# Patient Record
Sex: FEMALE
[De-identification: names, ages, dates, MRNs, and addresses within clinical notes are randomized per-mention and may not be internally consistent; named-entity substitution may affect disease eponyms.]

---

## 2017-11-17 ENCOUNTER — HOSPITAL ENCOUNTER (OUTPATIENT)
Dept: HOSPITAL 31 - C.ER | Age: 62
Setting detail: OBSERVATION
LOS: 3 days | Discharge: HOME | End: 2017-11-20
Attending: INTERNAL MEDICINE | Admitting: INTERNAL MEDICINE
Payer: MEDICAID

## 2017-11-17 DIAGNOSIS — I10: ICD-10-CM

## 2017-11-17 DIAGNOSIS — R07.89: Primary | ICD-10-CM

## 2017-11-17 LAB
ALBUMIN/GLOB SERPL: 1.6 {RATIO} (ref 1–2.1)
ALP SERPL-CCNC: 69 U/L (ref 38–126)
ALT SERPL-CCNC: 33 U/L (ref 9–52)
APTT BLD: 28 SECONDS (ref 21–34)
AST SERPL-CCNC: 18 U/L (ref 14–36)
BACTERIA #/AREA URNS HPF: (no result) /[HPF]
BASOPHILS # BLD AUTO: 0 K/UL (ref 0–0.2)
BASOPHILS NFR BLD: 0.5 % (ref 0–2)
BILIRUB SERPL-MCNC: 1 MG/DL (ref 0.2–1.3)
BILIRUB UR-MCNC: NEGATIVE MG/DL
BUN SERPL-MCNC: 20 MG/DL (ref 7–17)
CALCIUM SERPL-MCNC: 8.4 MG/DL (ref 8.6–10.4)
CHLORIDE SERPL-SCNC: 100 MMOL/L (ref 98–107)
CO2 SERPL-SCNC: 27 MMOL/L (ref 22–30)
EOSINOPHIL # BLD AUTO: 0.2 K/UL (ref 0–0.7)
EOSINOPHIL NFR BLD: 2.1 % (ref 0–4)
ERYTHROCYTE [DISTWIDTH] IN BLOOD BY AUTOMATED COUNT: 13.6 % (ref 11.5–14.5)
GLOBULIN SER-MCNC: 2.6 GM/DL (ref 2.2–3.9)
GLUCOSE SERPL-MCNC: 84 MG/DL (ref 65–105)
GLUCOSE UR STRIP-MCNC: NORMAL MG/DL
HCT VFR BLD CALC: 38.7 % (ref 34–47)
INR PPP: 1
KETONES UR STRIP-MCNC: NEGATIVE MG/DL
LEUKOCYTE ESTERASE UR-ACNC: (no result) LEU/UL
LYMPHOCYTES # BLD AUTO: 2 K/UL (ref 1–4.3)
LYMPHOCYTES NFR BLD AUTO: 22.6 % (ref 20–40)
MCH RBC QN AUTO: 28.8 PG (ref 27–31)
MCHC RBC AUTO-ENTMCNC: 34.2 G/DL (ref 33–37)
MCV RBC AUTO: 84.2 FL (ref 81–99)
MONOCYTES # BLD: 0.5 K/UL (ref 0–0.8)
MONOCYTES NFR BLD: 6.2 % (ref 0–10)
NRBC BLD AUTO-RTO: 0.1 % (ref 0–2)
PH UR STRIP: 6 [PH] (ref 5–8)
PLATELET # BLD: 256 K/UL (ref 130–400)
PMV BLD AUTO: 8.7 FL (ref 7.2–11.7)
POTASSIUM SERPL-SCNC: 3.5 MMOL/L (ref 3.6–5.2)
PROT SERPL-MCNC: 6.6 G/DL (ref 6.3–8.3)
PROT UR STRIP-MCNC: NEGATIVE MG/DL
RBC # UR STRIP: (no result) /UL
RBC #/AREA URNS HPF: 22 /HPF (ref 0–3)
SODIUM SERPL-SCNC: 137 MMOL/L (ref 132–148)
SP GR UR STRIP: 1.02 (ref 1–1.03)
TRANS CELLS #/AREA URNS HPF: < 1 /HPF (ref 0–3)
UROBILINOGEN UR-MCNC: NORMAL MG/DL (ref 0.2–1)
WBC # BLD AUTO: 8.7 K/UL (ref 4.8–10.8)
WBC #/AREA URNS HPF: 8 /HPF (ref 0–5)

## 2017-11-17 PROCEDURE — 84484 ASSAY OF TROPONIN QUANT: CPT

## 2017-11-17 PROCEDURE — 81001 URINALYSIS AUTO W/SCOPE: CPT

## 2017-11-17 PROCEDURE — 36415 COLL VENOUS BLD VENIPUNCTURE: CPT

## 2017-11-17 PROCEDURE — 85025 COMPLETE CBC W/AUTO DIFF WBC: CPT

## 2017-11-17 PROCEDURE — 85610 PROTHROMBIN TIME: CPT

## 2017-11-17 PROCEDURE — 90732 PPSV23 VACC 2 YRS+ SUBQ/IM: CPT

## 2017-11-17 PROCEDURE — 93306 TTE W/DOPPLER COMPLETE: CPT

## 2017-11-17 PROCEDURE — 70450 CT HEAD/BRAIN W/O DYE: CPT

## 2017-11-17 PROCEDURE — 71010: CPT

## 2017-11-17 PROCEDURE — 99285 EMERGENCY DEPT VISIT HI MDM: CPT

## 2017-11-17 PROCEDURE — 82948 REAGENT STRIP/BLOOD GLUCOSE: CPT

## 2017-11-17 PROCEDURE — 87086 URINE CULTURE/COLONY COUNT: CPT

## 2017-11-17 PROCEDURE — 93005 ELECTROCARDIOGRAM TRACING: CPT

## 2017-11-17 PROCEDURE — 90471 IMMUNIZATION ADMIN: CPT

## 2017-11-17 PROCEDURE — 80053 COMPREHEN METABOLIC PANEL: CPT

## 2017-11-17 PROCEDURE — 85730 THROMBOPLASTIN TIME PARTIAL: CPT

## 2017-11-17 NOTE — CP.PCM.HP
History of Present Illness





- History of Present Illness


History of Present Illness: 





COMPREHENSIVE   HISTORY & PHYSICAL EXAM 


   


HPI


One day history of left-sided chest pain stopped a stabbing with radiation to 

left arm associated with mild diaphoresis.  Patient was evaluated in the ER 

with normal troponin and EKG.


There is a past history of hypertension.





PAST HIST.


PERSONAL HIST:   Smoking.   N   Alcohol.   N      Allergy N            Travel_-

      .


FAMILY HIST : 


ROS :


Constitutional: Negative for weight change, chills, night sweats, fatigue and 

usage of assist device. 


  Eyes: Negative for redness, swelling, itching, discharge, vision changes, 

blurry vision, double vision, glaucoma, cataracts, 


  Ears: Negative for hearing loss, ringing, , tinnitus, vertigo


 Nose: Negative for rhinorrhea, stuffiness, sniffing, itching, postnasal drip, 

discoloration, nasal congestion and epistaxis. 


 Throat: Negative for throat clearing, sore throat, hoarseness, difficulty 

swallowing and difficulty speaking. 


  Respiratory: Negative for cough, chest tightness, sputum or phlegm, chronic 

cough, hemoptysis, wheezing, snoring at night, pleuritic chest pain and daytime 

somnolence. 


 Cardiovascular: Negative for orthopnea, PND, Edema of legs, leg cramps, angina

, claudication, , irregular heartbeat,


 Neurology: Negative for irritability, muscle weakness, numbness and tingling, 

seizures, tremors, migraines, slurred speech, syncope, memory loss, mood changes

, recurrent headaches 


Gastrointestinal: Negative for difficulty swallowing, diarrhea, constipation, 

black stools, rectal bleeding, nausea, flatulence, reflux, poor appetite, 

changes in bowel habits, abdominal pain


  Genitourinary: Negative for frequent urination, hematuria, discharge, 

incontinence, urinary retention, frequent UTI, Psychiatric: Negative for 

depression, anxiety/panic, suicidal tendencies, 


Musculoskeletal: Negative for swollen joints, back pain, , neck pain, morning 

stiffness of joints, . 


 Skin: Negative for rash, ulcers, itching, dry skin and pigmented lesions.


P/E: 


  Constitutional: Appears stated age and in no apparent distress. 


 Head: Normocephalic. 


  Ears: External ear canals patent without inflammation. Tympanic membranes 

intact with normal light reflex and landmark. 


  Eyes: Pupils are central, bilaterally equal, symmetrical and reacts to light 

with normal movements and no icterus or pallor. 


 Nose: External nares are patent. Mucosa is pink  Mouth-Throat: Good general 

appearance and condition. No post-pharyngeal/oropharyngeal erythema and 

tonsillar hypertrophy. Good dental hygiene. 


  Neck-Lymphatic: Neck is supple with normal ROM, no thyromegaly, lymph nodes 

or masses. JVD is normal with no carotid bruit. 


  Lungs: Clear to percussion and auscultation with bilateral normal air entry. 


  Cardiovascular: S1 and S2 are normal with no murmurs, gallops and rub. 


  GI Exam: No hepatomegaly. Abdomen is soft and non-tender. No Organomegaly , 

masses or hernias are evident and bowel sounds are normal and active. 


  Neurology: Higher function and all cranial nerves intact, with no gross motor 

or sensory deficit. Superficial and deep reflexes are normal with downwards 

planters. No cerebellar deficit with normal gait. 


  Musculoskeletal: No tender spots with normal curvature of the spine with no 

swelling or restricted ROM of the small and large joints. 


  Extremities: Homans sign absent. Intact pulses with no pitting edema, calf 

tenderness or skin color changes. 


  Skin: No rash, eruptions or abnormal skin pigmentation





LAB/RADIOLOGY:


ASSESMENT :


Acute coronary syndrome.


Hypertension.








Present on Admission





- Present on Admission


Any Indicators Present on Admission: No





Past Patient History





- Past Social History


Smoking Status: Never Smoked





- ENDOCRINE/METABOLIC


Hx Diabetes Mellitus Type 2: Yes (not on meds)





- PSYCHIATRIC


Hx Substance Use: No





- SURGICAL HISTORY


Hx Cholecystectomy: Yes





Meds


Allergies/Adverse Reactions: 


 Allergies











Allergy/AdvReac Type Severity Reaction Status Date / Time


 


No Known Allergies Allergy   Unverified 11/17/17 15:15














Results





- Vital Signs


Recent Vital Signs: 





 Last Vital Signs











Temp  98.0 F   11/17/17 18:51


 


Pulse  72   11/17/17 18:51


 


Resp  18   11/17/17 18:51


 


BP  146/78   11/17/17 18:51


 


Pulse Ox  100   11/17/17 18:51














- Labs


Result Diagrams: 


 11/17/17 15:56





 11/17/17 15:56


Labs: 





 Laboratory Results - last 24 hr











  11/17/17 11/17/17 11/17/17





  15:56 15:56 15:56


 


WBC  8.7  


 


RBC  4.59  


 


Hgb  13.2  


 


Hct  38.7  


 


MCV  84.2  


 


MCH  28.8  


 


MCHC  34.2  


 


RDW  13.6  


 


Plt Count  256  


 


MPV  8.7  


 


Neut % (Auto)  68.6  


 


Lymph % (Auto)  22.6  


 


Mono % (Auto)  6.2  


 


Eos % (Auto)  2.1  


 


Baso % (Auto)  0.5  


 


Neut #  5.9  


 


Lymph #  2.0  


 


Mono #  0.5  


 


Eos #  0.2  


 


Baso #  0.0  


 


PT   11.3 


 


INR   1.0 


 


APTT   28 


 


Sodium    137


 


Potassium    3.5 L


 


Chloride    100


 


Carbon Dioxide    27


 


Anion Gap    14


 


BUN    20 H


 


Creatinine    0.6 L


 


Est GFR ( Amer)    > 60


 


Est GFR (Non-Af Amer)    > 60


 


Random Glucose    84


 


Calcium    8.4 L


 


Total Bilirubin    1.0


 


AST    18


 


ALT    33


 


Alkaline Phosphatase    69


 


Troponin I    < 0.0120


 


Total Protein    6.6


 


Albumin    4.1


 


Globulin    2.6


 


Albumin/Globulin Ratio    1.6


 


Urine Color   


 


Urine Clarity   


 


Urine pH   


 


Ur Specific Gravity   


 


Urine Protein   


 


Urine Glucose (UA)   


 


Urine Ketones   


 


Urine Blood   


 


Urine Nitrate   


 


Urine Bilirubin   


 


Urine Urobilinogen   


 


Ur Leukocyte Esterase   


 


Urine WBC (Auto)   


 


Urine RBC (Auto)   


 


Ur Squamous Epith Cells   


 


Ur Transition Epith Cell   


 


Urine Bacteria   














  11/17/17





  16:00


 


WBC 


 


RBC 


 


Hgb 


 


Hct 


 


MCV 


 


MCH 


 


MCHC 


 


RDW 


 


Plt Count 


 


MPV 


 


Neut % (Auto) 


 


Lymph % (Auto) 


 


Mono % (Auto) 


 


Eos % (Auto) 


 


Baso % (Auto) 


 


Neut # 


 


Lymph # 


 


Mono # 


 


Eos # 


 


Baso # 


 


PT 


 


INR 


 


APTT 


 


Sodium 


 


Potassium 


 


Chloride 


 


Carbon Dioxide 


 


Anion Gap 


 


BUN 


 


Creatinine 


 


Est GFR ( Amer) 


 


Est GFR (Non-Af Amer) 


 


Random Glucose 


 


Calcium 


 


Total Bilirubin 


 


AST 


 


ALT 


 


Alkaline Phosphatase 


 


Troponin I 


 


Total Protein 


 


Albumin 


 


Globulin 


 


Albumin/Globulin Ratio 


 


Urine Color  Yellow


 


Urine Clarity  Clear


 


Urine pH  6.0


 


Ur Specific Gravity  1.024


 


Urine Protein  Negative


 


Urine Glucose (UA)  Normal


 


Urine Ketones  Negative


 


Urine Blood  2+ H


 


Urine Nitrate  Negative


 


Urine Bilirubin  Negative


 


Urine Urobilinogen  Normal


 


Ur Leukocyte Esterase  1+ H


 


Urine WBC (Auto)  8 H


 


Urine RBC (Auto)  22 H


 


Ur Squamous Epith Cells  1


 


Ur Transition Epith Cell  < 1


 


Urine Bacteria  Occ H

## 2017-11-17 NOTE — CT
PROCEDURE:  CT HEAD WITHOUT CONTRAST.



HISTORY:

ha



COMPARISON:

None available. 



TECHNIQUE:

Axial computed tomography images were obtained through the head/brain 

without intravenous contrast.  



Radiation dose:



Total exam DLP = 842.23 mGy-cm.



This CT exam was performed using one or more of the following dose 

reduction techniques: Automated exposure control, adjustment of the 

mA and/or kV according to patient size, and/or use of iterative 

reconstruction technique.



FINDINGS:



HEMORRHAGE:

No intracranial hemorrhage. 



BRAIN:

Probable calcified meningioma in the anterior right temporal lobe/ 

right middle cranial fossa.  Recommend correlation with prior outside 

studies. Several incidental parenchymal calcifications in the left 

posterior temporal lobe common nonspecific. No evidence of acute 

infarct.



VENTRICLES:

Unremarkable. No hydrocephalus. 



CALVARIUM:

Unremarkable.



PARANASAL SINUSES:

Unremarkable as visualized. No significant inflammatory changes.



MASTOID AIR CELLS:

Unremarkable as visualized. No inflammatory changes.



OTHER FINDINGS:

None.



IMPRESSION:

No evidence of acute infarct. No intracranial hemorrhage. Probable 

calcified meningioma in the right temporal lobe/right middle cranial 

fossa. .  Please correlate with prior outside images if available.  

No acute findings.

## 2017-11-17 NOTE — RAD
PROCEDURE:  CHEST RADIOGRAPH, 1 VIEW



HISTORY:

chest pain



COMPARISON:

None available.



FINDINGS:



LUNGS:

Clear.



PLEURA:

No pneumothorax or pleural fluid seen.



CARDIOVASCULAR:

Normal.



OSSEOUS STRUCTURES:

No significant abnormalities.



VISUALIZED UPPER ABDOMEN:

Normal.



OTHER FINDINGS:

None. 



IMPRESSION:

No active disease.

## 2017-11-17 NOTE — C.PDOC
History Of Present Illness


63 y/o female, with PMHx of HTN, presents to ED for evaluation of intermittent b

/l stabbing chest pain and headache that developed today 2 hrs pta. Denies 

trauma, injury, fever or any other complaints at this time. no previou cardiac 

history. 





Time Seen by Provider: 17 15:36


Chief Complaint (Nursing): Chest Pain


History Per: Patient


History/Exam Limitations: no limitations


Onset/Duration Of Symptoms: Days


Current Symptoms Are (Timing): Still Present


Quality: "Pain"


Associated Symptoms: denies: Nausea, Dyspnea, Diaphoresis, Syncope


Modifying Factors: None


Exacerbating Factors: None


Alleviating Factors: None


Recent travel outside of the [place]: No


Additional History Per: Patient





Past Medical History


Reviewed: Historical Data, Nursing Documentation, Vital Signs


Vital Signs: 


 Last Vital Signs











Temp  97.5 F L  17 07:00


 


Pulse  68   17 07:00


 


Resp  20   17 07:00


 


BP  154/76 H  17 09:13


 


Pulse Ox  98   17 07:00











Surgical History: Cholecystectomy





- [facility] Procedures








DESTRUCT LARYNX LES NEC (97)








Family History: States: Unknown Family Hx





- Social History


Hx Alcohol Use: No


Hx Substance Use: No





Review Of Systems


Except As Marked, All Systems Reviewed And Found Negative.


Constitutional: Negative for: Fever, Chills


Cardiovascular: Positive for: Chest Pain.  Negative for: Palpitations


Respiratory: Negative for: Cough, Shortness of Breath


Neurological: Positive for: Headache.  Negative for: Weakness, Numbness, 

Dizziness





Physical Exam





- Physical Exam


Appears: Non-toxic, No Acute Distress


Skin: Normal Color, Warm, Dry


Head: Atraumatic, Normacephalic


Eye(s): bilateral: Normal Inspection


Oral Mucosa: Moist


Neck: Supple


Chest: Symmetrical, No Deformity, No Tenderness


Cardiovascular: Rhythm Regular, No Murmur


Respiratory: Normal Breath Sounds, No Rales, No Rhonchi, No Wheezing


Gastrointestinal/Abdominal: Soft, No Tenderness


Extremity: Normal ROM, No Pedal Edema


Neurological/Psych: Oriented x3, Normal Speech





ED Course And Treatment





- Laboratory Results


Result Diagrams: 


 17 07:00





 17 07:00


ECG: Interpreted By Me, Viewed By Me


ECG Rhythm: Sinus Bradycardia


ECG Interpretation: No Acute Changes


Rate From EC (bpm)


O2 Sat by Pulse Oximetry: 100 (RA)


Pulse Ox Interpretation: Normal





- CT Scan/US


  ** Head CT


Other Rad Studies (CT/US): Read By Radiologist, Radiology Report Reviewed


CT/US Interpretation: Accession No. : [id number].  Patient Name / ID : 

RONA VERDE  / [id number].  Exam Date : 2017 16:15:34 ( 

Approved ).  Study Comment :  Sex / Age : F  / 62Y.  Creator : Alea Luis.

  Dictator : Mohamud Cunha MD.   :  Approver : Mohamud Cunha MD.  Approver2 :  Report Date : 2017 16:20:45.  My Comment :  ******

*****************************************************************************.  

PROCEDURE:  CT HEAD WITHOUT CONTRAST.  HISTORY:  ha.  COMPARISON:  None 

available.  TECHNIQUE:  Axial computed tomography images were obtained through 

the head/brain without intravenous contrast.  Radiation dose:  Total exam DLP = 

842.23 mGy-cm.  This CT exam was performed using one or more of the following 

dose reduction techniques: Automated exposure control, adjustment of the mA and/

or kV according to patient size, and/or use of iterative reconstruction 

technique.  FINDINGS:  HEMORRHAGE:  No intracranial hemorrhage.  BRAIN:  

Probable calcified meningioma in the anterior right temporal lobe/ right middle 

cranial fossa.  Recommend correlation with prior outside studies. Several 

incidental parenchymal calcifications in the left posterior temporal lobe 

common nonspecific. No evidence of acute infarct.  VENTRICLES:  Unremarkable. 

No hydrocephalus.  CALVARIUM:  Unremarkable.  PARANASAL SINUSES:  Unremarkable 

as visualized. No significant inflammatory changes.  MASTOID AIR CELLS:  

Unremarkable as visualized. No inflammatory changes.  OTHER FINDINGS:  None.  

IMPRESSION:  No evidence of acute infarct. No intracranial hemorrhage. Probable 

calcified meningioma in the right temporal lobe/right middle cranial fossa. .  

Please correlate with prior outside images if available.  No acute findings.





Medical Decision Making


Medical Decision Making: 


Blood work, UA, head CT, CXR, EKG ordered and reviewed.





atypcial cp, however pt only c/o of 2 hours of pain. will obs for repeat trop, 

as 1 set is indeterminate. dr dunaway accepts





Disposition





- Disposition


Disposition: HOSPITALIZED


Disposition Time: 06:00


Condition: STABLE





- Clinical Impression


Clinical Impression: 


 Chest pain








- Scribe Statement


The provider has reviewed the documentation as recorded by the Scribe


Constance Thompson





All medical record entries made by the Scribe were at my direction and 

personally dictated by me. I have reviewed the chart and agree that the record 

accurately reflects my personal performance of the history, physical exam, 

medical decision making, and the department course for this patient. I have 

also personally directed, reviewed, and agree with the discharge instructions 

and disposition.





Decision To Admit





- Pt Status Changed To:


Hospital Disposition Of: Observation





- .


Bed Request Type: Telemetry


Admitting Physician: Melisa Dunaway


Patient Diagnosis: 


 Chest pain

## 2017-11-18 LAB
ALBUMIN/GLOB SERPL: 1.6 {RATIO} (ref 1–2.1)
ALP SERPL-CCNC: 66 U/L (ref 38–126)
ALT SERPL-CCNC: 35 U/L (ref 9–52)
AST SERPL-CCNC: 20 U/L (ref 14–36)
BASOPHILS # BLD AUTO: 0 K/UL (ref 0–0.2)
BASOPHILS NFR BLD: 0.4 % (ref 0–2)
BILIRUB SERPL-MCNC: 1 MG/DL (ref 0.2–1.3)
BUN SERPL-MCNC: 19 MG/DL (ref 7–17)
CALCIUM SERPL-MCNC: 8.7 MG/DL (ref 8.6–10.4)
CHLORIDE SERPL-SCNC: 101 MMOL/L (ref 98–107)
CO2 SERPL-SCNC: 27 MMOL/L (ref 22–30)
EOSINOPHIL # BLD AUTO: 0.3 K/UL (ref 0–0.7)
EOSINOPHIL NFR BLD: 3.7 % (ref 0–4)
ERYTHROCYTE [DISTWIDTH] IN BLOOD BY AUTOMATED COUNT: 13.4 % (ref 11.5–14.5)
GLOBULIN SER-MCNC: 2.3 GM/DL (ref 2.2–3.9)
GLUCOSE SERPL-MCNC: 86 MG/DL (ref 65–105)
HCT VFR BLD CALC: 38.1 % (ref 34–47)
LYMPHOCYTES # BLD AUTO: 2.6 K/UL (ref 1–4.3)
LYMPHOCYTES NFR BLD AUTO: 30.7 % (ref 20–40)
MCH RBC QN AUTO: 29 PG (ref 27–31)
MCHC RBC AUTO-ENTMCNC: 34.6 G/DL (ref 33–37)
MCV RBC AUTO: 83.8 FL (ref 81–99)
MONOCYTES # BLD: 0.5 K/UL (ref 0–0.8)
MONOCYTES NFR BLD: 5.8 % (ref 0–10)
NRBC BLD AUTO-RTO: 0 % (ref 0–2)
PLATELET # BLD: 251 K/UL (ref 130–400)
PMV BLD AUTO: 8.5 FL (ref 7.2–11.7)
POTASSIUM SERPL-SCNC: 3.6 MMOL/L (ref 3.6–5.2)
PROT SERPL-MCNC: 6 G/DL (ref 6.3–8.3)
SODIUM SERPL-SCNC: 137 MMOL/L (ref 132–148)
WBC # BLD AUTO: 8.4 K/UL (ref 4.8–10.8)

## 2017-11-18 RX ADMIN — ENOXAPARIN SODIUM SCH MG: 40 INJECTION SUBCUTANEOUS at 10:05

## 2017-11-18 NOTE — CP.PCM.PN
Subjective





- Date & Time of Evaluation


Date of Evaluation: 11/18/17


Time of Evaluation: 14:07





- Subjective


Subjective: 





CHIEF COMPLAINTS TODAY :


LESS CP 


TNI NEG 





ROS.


HEENT :  N.


Resp :       No cough, wheezing ,pleuritic CP ,or hemoptysis 


Cardio :     No anginal  CP, PND, orthopnea, palpitation 


GI :           No abd.pain, n/v ,diarrhea or GI bleeding .


CNS : No headache, vertigo, focal deficit.


Musculoskel :  No joint swelling ,


Derm :        No rash 


Psych :     Normal affect.


Ext :  No  swelling ,calf pain 





PE.


Pt. is alert awake in no distress.


V.S  As noted in the chart 


Head ,ear nose,throat and eyes : Normal.


Neck : Supple with normal carotids.


Lungs: Clear air entry.


Heart : S1 & S2 normal with S4. No murmur.


Abd : Soft non tender with normal bowel sounds.


Neuro : Moves all ext. with no localized deficit.


Ext : No edema with intact pulses.Non tender calves 


Derm : No rashes or decubitus ulcer.





LABS/RADIOLOGY:





ASSESSMENT/PLAN : 


CHECK ECHO 











Objective





- Vital Signs/Intake and Output


Vital Signs (last 24 hours): 


 











Temp Pulse Resp BP Pulse Ox


 


 97.8 F   67   20   172/91 H  99 


 


 11/18/17 07:00  11/18/17 09:00  11/18/17 07:00  11/18/17 08:22  11/18/17 07:00








Intake and Output: 


 











 11/18/17 11/18/17





 11:59 23:59


 


Intake Total 240 


 


Balance 240 














- Medications


Medications: 


 Current Medications





Aspirin (Ecotrin)  81 mg PO DAILY [facility]


   Last Admin: 11/18/17 10:06 Dose:  81 mg


Enoxaparin Sodium (Lovenox)  40 mg SC DAILY [facility]


   Last Admin: 11/18/17 10:05 Dose:  40 mg


Metoprolol Tartrate (Lopressor)  25 mg PO BID [facility]


   Last Admin: 11/18/17 09:59 Dose:  Not Given


Pneumococcal Polyvalent Vaccine (Pneumovax 23 Vaccine)  0.5 ml IM .ONCE ONE


   Stop: 11/19/17 10:01











- Labs


Labs: 


 





 11/18/17 07:00 





 11/18/17 07:00 





 











PT  11.3 SECONDS (9.7-12.2)   11/17/17  15:56    


 


INR  1.0   11/17/17  15:56    


 


APTT  28 SECONDS (21-34)   11/17/17  15:56

## 2017-11-18 NOTE — CARD
--------------- APPROVED REPORT --------------





EXAM: Two-dimensional and M-mode echocardiogram with Doppler and 

color Doppler.



Other Information 

Quality : GoodRhythm : NSR



INDICATION

CAD Chest Pain 



M-Mode DIMENSIONS 

RVDd1.81   (2.1-3.2cm)Left Atrium (MM)2.55   (2.5-4.0cm)

IVSd0.70   (0.7-1.1cm)Aortic Root2.61   (2.2-3.7cm)

LVDd4.95   (4.0-5.6cm)Aortic Cusp Exc.1.49   (1.5-2.0cm)

PWd0.58   (0.7-1.1cm)FS (%) 40   %

LVDs2.95   (2.0-3.8cm)LVEF (%)71   (>50%)



Aortic Valve

AoV Peak Dhnriqyw440.4cm/Eliezer Peak GR.8mmHg



Mitral Valve

MV E Smcemmhe83.0cm/sMV A Fzmvpwnl581.1cm/sE/A ratio0.9



TDI

E/Lateral E'0.0E/Medial E'0.0



Tricuspid Valve

TR Peak Goyqoryv337bn/sTR Peak Gr.71ngEnIPGU98ueUl



 LEFT VENTRICLE 

The left ventricle is normal size.

There is normal left ventricular wall thickness.

Left ventricle systolic function is normal.

The Ejection Fraction is 65-70%.

There is normal LV segmental wall motion.

Transmitral Doppler flow pattern is Grade I-abnormal relaxation 

pattern.

There is no ventricular septal defect visualized.



 RIGHT VENTRICLE 

The right ventricle is normal size.

The right ventricular systolic function is normal.



 ATRIA 

The left atrium is borderline dilated. 

The right atrium size is normal.



 AORTIC VALVE 

The aortic valve is mildly sclerotic.

The aortic valve is tri-cuspid.

No aortic regurgitation is present.

There is no aortic valvular stenosis. 



 MITRAL VALVE 

The mitral valve is normal in structure.

There is no evidence of mitral valve prolapse.

Mitral regurgitation is trace.



 TRICUSPID VALVE 

The tricuspid valve is normal in structure.

There is trace tricuspid regurgitation.

There is no pulmonary hypertension.



 PULMONIC VALVE 

The pulmonic valve is not well visualized.

There is no pulmonic valvular regurgitation. 



 GREAT VESSELS 

The aortic root is normal in size.

The ascending aorta is normal in size.

The IVC is normal in size and collapses >50% with inspiration.



 PERICARDIAL EFFUSION 

There is no pericardial effusion.



<Conclusion>

Left ventricle systolic function is normal.

The Ejection Fraction is 65-70%.

Transmitral Doppler flow pattern is Grade I-abnormal relaxation 

pattern.

Mitral regurgitation is trace.

## 2017-11-19 RX ADMIN — ENOXAPARIN SODIUM SCH MG: 40 INJECTION SUBCUTANEOUS at 09:12

## 2017-11-19 NOTE — CP.PCM.PN
Subjective





- Date & Time of Evaluation


Date of Evaluation: 11/19/17


Time of Evaluation: 14:49





- Subjective


Subjective: 





CHIEF COMPLAINTS TODAY :


LESS CP 


TNI NEG 





ROS.


HEENT :  N.


Resp :       No cough, wheezing ,pleuritic CP ,or hemoptysis 


Cardio :     No anginal  CP, PND, orthopnea, palpitation 


GI :           No abd.pain, n/v ,diarrhea or GI bleeding .


CNS : No headache, vertigo, focal deficit.


Musculoskel :  No joint swelling ,


Derm :        No rash 


Psych :     Normal affect.


Ext :  No  swelling ,calf pain 





PE.


Pt. is alert awake in no distress.


V.S  As noted in the chart 


Head ,ear nose,throat and eyes : Normal.


Neck : Supple with normal carotids.


Lungs: Clear air entry.


Heart : S1 & S2 normal with S4. No murmur.


Abd : Soft non tender with normal bowel sounds.


Neuro : Moves all ext. with no localized deficit.


Ext : No edema with intact pulses.Non tender calves 


Derm : No rashes or decubitus ulcer.





LABS/RADIOLOGY: ECHO NORMAL 





ASSESSMENT/PLAN : 


CHECK URINE C/S 





Objective





- Vital Signs/Intake and Output


Vital Signs (last 24 hours): 


 











Temp Pulse Resp BP Pulse Ox


 


 98.2 F   63   20   149/73   97 


 


 11/19/17 14:24  11/19/17 14:24  11/19/17 14:24  11/19/17 14:24  11/19/17 14:24








Intake and Output: 


 











 11/19/17 11/19/17





 11:59 23:59


 


Intake Total 240 500


 


Balance 240 500














- Medications


Medications: 


 Current Medications





Acetaminophen (Tylenol 325mg Tab)  650 mg PO Q4 PRN


   PRN Reason: Pain, moderate (4-7)


   Last Admin: 11/19/17 14:39 Dose:  650 mg


Aspirin (Ecotrin)  81 mg PO DAILY [facility]


   Last Admin: 11/19/17 09:13 Dose:  81 mg


Enoxaparin Sodium (Lovenox)  40 mg SC DAILY [facility]


   Last Admin: 11/19/17 09:12 Dose:  40 mg


Metoprolol Tartrate (Lopressor)  25 mg PO BID [facility]


   Last Admin: 11/19/17 09:13 Dose:  25 mg











- Labs


Labs: 


 





 11/18/17 07:00 





 11/18/17 07:00 





 











PT  11.3 SECONDS (9.7-12.2)   11/17/17  15:56    


 


INR  1.0   11/17/17  15:56    


 


APTT  28 SECONDS (21-34)   11/17/17  15:56

## 2017-11-20 VITALS — OXYGEN SATURATION: 98 % | HEART RATE: 72 BPM | RESPIRATION RATE: 18 BRPM | TEMPERATURE: 97.3 F

## 2017-11-20 VITALS — DIASTOLIC BLOOD PRESSURE: 82 MMHG | SYSTOLIC BLOOD PRESSURE: 155 MMHG

## 2017-11-20 RX ADMIN — ENOXAPARIN SODIUM SCH MG: 40 INJECTION SUBCUTANEOUS at 10:34

## 2017-11-20 NOTE — CARD
--------------- APPROVED REPORT --------------





EKG Measurement

Heart [id number]

UT 162P10

[id number]

[id number]

[id number]



<Conclusion>

Normal sinus rhythm

Normal ECG

## 2017-11-20 NOTE — CP.PCM.PN
Subjective





- Date & Time of Evaluation


Date of Evaluation: 11/20/17


Time of Evaluation: 13:26





- Subjective


Subjective: 





PT SEEN AND HAS NO COMPLAINTS OF SOB, DIZZINESS, H/A, CP, DYSURIA, ABD PAIN.  

PT IS VERY EAGER TO BE D/C HOME TODAY.  


EXAM UNREMARKABLE. LABS REVIEWED; URINE C&S NEGATIVE. VSS.


OK TO D/C HOME TODAY  PER DR. PIERSON.  PT TO F/U WITH HER PMD DR. PATRICIA SETH IN 

1 WEEK.  CONTINUE HOME MEDS AND ASA 81 MG PO QD. REFILL RX GIVEN FOR LISINOPRIL 

AS PT RAN OUT.  OFFERED MEDS TO BED BUT PT REFUSED AND PREFERS TO USE HER OWN 

PHARMACY.  


GIVEN CARDIOLOGY DEPT INFORMATION AND DR. PIERSON'S OFFICE INFORMATION FOR 

OUTPATIENT IV LEXISCAN---TO BE DONE AS OP PER DR. PIERSON.  NO FURTHER ORDERS. 





Objective





- Vital Signs/Intake and Output


Vital Signs (last 24 hours): 


 











Temp Pulse Resp BP Pulse Ox


 


 97.3 F L  72   18   155/82 H  98 


 


 11/20/17 08:19  11/20/17 08:19  11/20/17 08:19  11/20/17 10:35  11/20/17 08:19











- Medications


Medications: 


 Current Medications





Acetaminophen (Tylenol 325mg Tab)  650 mg PO Q4 PRN


   PRN Reason: Pain, moderate (4-7)


   Last Admin: 11/19/17 14:39 Dose:  650 mg


Aspirin (Ecotrin)  81 mg PO DAILY [facility]


   Last Admin: 11/20/17 10:33 Dose:  81 mg


Enoxaparin Sodium (Lovenox)  40 mg SC DAILY [facility]


   Last Admin: 11/20/17 10:34 Dose:  40 mg


Metoprolol Tartrate (Lopressor)  25 mg PO BID [facility]


   Last Admin: 11/20/17 10:35 Dose:  25 mg











- Labs


Labs: 


 





 11/18/17 07:00 





 11/18/17 07:00 





 











PT  11.3 SECONDS (9.7-12.2)   11/17/17  15:56    


 


INR  1.0   11/17/17  15:56    


 


APTT  28 SECONDS (21-34)   11/17/17  15:56

## 2017-11-20 NOTE — CARD
--------------- APPROVED REPORT --------------





EKG Measurement

Heart [id number]

MO [id number]

[id number]

[id number]

[id number]



<Conclusion>

Sinus bradycardia

Otherwise normal ECG
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
## 2018-04-26 ENCOUNTER — HOSPITAL ENCOUNTER (EMERGENCY)
Dept: HOSPITAL 31 - C.ER | Age: 63
LOS: 1 days | Discharge: HOME | End: 2018-04-27
Payer: MEDICAID

## 2018-04-26 VITALS
HEART RATE: 76 BPM | OXYGEN SATURATION: 98 % | DIASTOLIC BLOOD PRESSURE: 78 MMHG | SYSTOLIC BLOOD PRESSURE: 147 MMHG | TEMPERATURE: 98 F

## 2018-04-26 DIAGNOSIS — M25.511: ICD-10-CM

## 2018-04-26 DIAGNOSIS — M25.512: Primary | ICD-10-CM

## 2018-04-27 VITALS — RESPIRATION RATE: 20 BRPM
